# Patient Record
Sex: FEMALE | Race: WHITE | HISPANIC OR LATINO | URBAN - METROPOLITAN AREA
[De-identification: names, ages, dates, MRNs, and addresses within clinical notes are randomized per-mention and may not be internally consistent; named-entity substitution may affect disease eponyms.]

---

## 2024-08-28 ENCOUNTER — APPOINTMENT (RX ONLY)
Dept: URBAN - METROPOLITAN AREA CLINIC 108 | Facility: CLINIC | Age: 64
Setting detail: DERMATOLOGY
End: 2024-08-28

## 2024-08-28 DIAGNOSIS — L71.0 PERIORAL DERMATITIS: ICD-10-CM | Status: INADEQUATELY CONTROLLED

## 2024-08-28 PROCEDURE — 99204 OFFICE O/P NEW MOD 45 MIN: CPT

## 2024-08-28 PROCEDURE — ? PRESCRIPTION MEDICATION MANAGEMENT

## 2024-08-28 PROCEDURE — ? COUNSELING

## 2024-08-28 PROCEDURE — ? FULL BODY SKIN EXAM - DECLINED

## 2024-08-28 PROCEDURE — ? PRESCRIPTION

## 2024-08-28 RX ORDER — TACROLIMUS 1 MG/G
OINTMENT TOPICAL
Qty: 30 | Refills: 1 | Status: ERX | COMMUNITY
Start: 2024-08-28

## 2024-08-28 RX ADMIN — TACROLIMUS: 1 OINTMENT TOPICAL at 00:00

## 2024-08-28 ASSESSMENT — LOCATION SIMPLE DESCRIPTION DERM
LOCATION SIMPLE: RIGHT EYEBROW
LOCATION SIMPLE: LEFT EYEBROW

## 2024-08-28 ASSESSMENT — LOCATION ZONE DERM: LOCATION ZONE: FACE

## 2024-08-28 ASSESSMENT — LOCATION DETAILED DESCRIPTION DERM
LOCATION DETAILED: LEFT CENTRAL EYEBROW
LOCATION DETAILED: RIGHT CENTRAL EYEBROW

## 2024-08-28 NOTE — PROCEDURE: PRESCRIPTION MEDICATION MANAGEMENT
Initiate Treatment: tacrolimus 0.1 % topical ointment: Apply to affected areas around the eyes twice a day
Render In Strict Bullet Format?: Yes
Plan: Recommend Zyrtec one pill, once daily \\n\\nPt was seen by Ophthalmologist who refer patient to see a dermatologist
Detail Level: Simple
Discontinue Regimen: Lotrimin (pt was previously applying)